# Patient Record
Sex: MALE | Race: AMERICAN INDIAN OR ALASKA NATIVE
[De-identification: names, ages, dates, MRNs, and addresses within clinical notes are randomized per-mention and may not be internally consistent; named-entity substitution may affect disease eponyms.]

---

## 2019-01-04 ENCOUNTER — HOSPITAL ENCOUNTER (OUTPATIENT)
Dept: HOSPITAL 42 - RAD | Age: 74
End: 2019-01-04
Payer: MEDICARE

## 2019-05-25 PROBLEM — Z00.00 ENCOUNTER FOR PREVENTIVE HEALTH EXAMINATION: Status: ACTIVE | Noted: 2019-05-25

## 2019-06-24 ENCOUNTER — APPOINTMENT (OUTPATIENT)
Dept: ORTHOPEDIC SURGERY | Facility: CLINIC | Age: 74
End: 2019-06-24
Payer: MEDICARE

## 2019-06-24 DIAGNOSIS — Z82.61 FAMILY HISTORY OF ARTHRITIS: ICD-10-CM

## 2019-06-24 DIAGNOSIS — Z87.39 PERSONAL HISTORY OF OTHER DISEASES OF THE MUSCULOSKELETAL SYSTEM AND CONNECTIVE TISSUE: ICD-10-CM

## 2019-06-24 DIAGNOSIS — M25.562 PAIN IN LEFT KNEE: ICD-10-CM

## 2019-06-24 DIAGNOSIS — Z86.39 PERSONAL HISTORY OF OTHER ENDOCRINE, NUTRITIONAL AND METABOLIC DISEASE: ICD-10-CM

## 2019-06-24 DIAGNOSIS — Z86.73 PERSONAL HISTORY OF TRANSIENT ISCHEMIC ATTACK (TIA), AND CEREBRAL INFARCTION W/OUT RESIDUAL DEFICITS: ICD-10-CM

## 2019-06-24 DIAGNOSIS — M25.561 PAIN IN RIGHT KNEE: ICD-10-CM

## 2019-06-24 DIAGNOSIS — Z86.79 PERSONAL HISTORY OF OTHER DISEASES OF THE CIRCULATORY SYSTEM: ICD-10-CM

## 2019-06-24 PROCEDURE — 73562 X-RAY EXAM OF KNEE 3: CPT | Mod: 50

## 2019-06-24 PROCEDURE — 99203 OFFICE O/P NEW LOW 30 MIN: CPT

## 2019-06-24 RX ORDER — IBUPROFEN 200 MG/1
200 TABLET, FILM COATED ORAL
Refills: 0 | Status: ACTIVE | COMMUNITY

## 2019-06-24 RX ORDER — MV-MIN/FOLIC/K1/LYCOPEN/LUTEIN 300-60 MCG
TABLET ORAL
Refills: 0 | Status: ACTIVE | COMMUNITY

## 2019-06-24 RX ORDER — EZETIMIBE 10 MG/1
10 TABLET ORAL
Refills: 0 | Status: ACTIVE | COMMUNITY

## 2019-06-24 RX ORDER — GABAPENTIN 300 MG
300 TABLET ORAL
Refills: 0 | Status: ACTIVE | COMMUNITY

## 2019-06-24 RX ORDER — TELMISARTAN AND HYDROCHLOROTHIAZIDE 80; 12.5 MG/1; MG/1
80-12.5 TABLET ORAL
Refills: 0 | Status: ACTIVE | COMMUNITY

## 2019-06-24 RX ORDER — MULTIVIT-MIN/FOLIC/VIT K/LYCOP 400-300MCG
1000 TABLET ORAL
Refills: 0 | Status: ACTIVE | COMMUNITY

## 2019-06-24 RX ORDER — MAGNESIUM CHLORIDE 64 MG
TABLET, DELAYED RELEASE (ENTERIC COATED) ORAL
Refills: 0 | Status: ACTIVE | COMMUNITY

## 2019-06-24 RX ORDER — OXYCODONE HYDROCHLORIDE AND ACETAMINOPHEN 7.5; 325 MG/1; MG/1
7.5-325 TABLET ORAL
Refills: 0 | Status: ACTIVE | COMMUNITY

## 2019-06-24 RX ORDER — BLOOD SUGAR DIAGNOSTIC
300 STRIP MISCELLANEOUS
Refills: 0 | Status: ACTIVE | COMMUNITY

## 2019-06-24 RX ORDER — CARVEDILOL PHOSPHATE 40 MG/1
40 CAPSULE, EXTENDED RELEASE ORAL
Refills: 0 | Status: ACTIVE | COMMUNITY

## 2019-06-24 RX ORDER — CLOPIDOGREL 75 MG/1
75 TABLET, FILM COATED ORAL
Refills: 0 | Status: ACTIVE | COMMUNITY

## 2019-06-24 RX ORDER — DIGOXIN 250 UG/1
250 TABLET ORAL
Refills: 0 | Status: ACTIVE | COMMUNITY

## 2019-06-24 RX ORDER — ADHESIVE TAPE 3"X 2.3 YD
50 MCG TAPE, NON-MEDICATED TOPICAL
Refills: 0 | Status: ACTIVE | COMMUNITY

## 2019-06-24 RX ORDER — ZOLPIDEM TARTRATE 10 MG/1
10 TABLET ORAL
Refills: 0 | Status: ACTIVE | COMMUNITY

## 2019-06-24 RX ORDER — DEXLANSOPRAZOLE 30 MG/1
30 CAPSULE, DELAYED RELEASE ORAL
Refills: 0 | Status: ACTIVE | COMMUNITY

## 2019-06-24 RX ORDER — ATORVASTATIN CALCIUM 40 MG/1
40 TABLET, FILM COATED ORAL
Refills: 0 | Status: ACTIVE | COMMUNITY

## 2019-06-24 NOTE — PHYSICAL EXAM
[de-identified] : Radiographs from 6/24/19 AP lateral and skyline views of both knees show the bony structures to be intact, he has almost complete loss of the medial joint space of both knees.  [de-identified] : General appearance: well nourished and hydrated, pleasant, alert and oriented x 3, cooperative.\par Cardiovascular: no apparent abnormalities, no lower leg edema, no varicosities, pedal pulses are palpable.\par Neurologic: sensation is normal, no muscle weakness in upper or lower extremities\par Dermatologic no apparent skin lesions, moist, warm, no rash.\par Gait: nonantalgic.\par \par Left knee\par Inspection: no effusion or erythema.\par Wounds: none.\par Alignment: normal.\par Palpation: medial joint line tenderness\par ROM: Full ROM \par Ligamentous laxity: all ligaments appear stable\par Meniscal Test: n/a\par Muscle Test: good quad strength.\par \par Right knee\par Inspection: no effusion or erythema.\par Wounds: none.\par Alignment: normal.\par Palpation: medial joint line tenderness\par ROM: Full ROM\par Ligamentous laxity: all ligaments appear stable\par Meniscal Test: n/a\par Muscle Test: good quad strength.\par \par Left hip\par Inspection: No swelling or ecchymosis.\par Wounds: none.\par Palpation: non-tender.\par Stability: no instability.\par Strength: 5/5 all motor groups.\par ROM: no pain with FROM.\par Leg length: equal.\par \par Right hip\par Inspection: No swelling or ecchymosis.\par Wounds: none.\par Palpation: non-tender.\par Stability: no instability.\par Strength: 5/5 all motor groups.\par ROM: no pain with FROM.\par Leg length: equal.

## 2019-06-24 NOTE — ASSESSMENT
[FreeTextEntry1] : Pt complaining of bilateral knee pain. He had a cortisone injection by his PCP in the right knee in February. He also had injections in Sept and Dec with relief. Currently he describes pain that is a 9/10 at times, difficulty walking more then a block. Treatment options were discussed both surgical and non surgical, he opted for gel injections. Pt is also suffering from back pain, his PCP has prescribed Percocet for that, i suggested he see a spine specialist to see if there is other alternatives for the treatment of his back pain.

## 2019-06-24 NOTE — HISTORY OF PRESENT ILLNESS
[Pain Location] : pain [Worsening] : worsening [10] : a maximum pain level of 10/10 [Walking] : walking [Standing] : standing [Daily] : ~He/She~ states the symptoms seem to be occuring daily [de-identified] : 74 year old male presents to the office today complaining of bilateral knee pain. He states that his knee pain first began in the right knee, but in recent months he has been noticing the pain increase in his left knee. Pt reports a pain that is sharp in nature. He reports buckling and clicking in bilateral knees. He also reports swelling in the right knee that is more prominent than the swelling in the left knee. Pt reports using a cane occasionally which he reports using due to his knees buckling. Pt reports only being able to ambulate about 1/2 a block before pain stops him. Pt reports increased pain with negotiating stairs, long periods of ambulation, and standing for long periods of time. Pt reports having to ambulate down the stairs backward due to his knee pain. Pt reports taking Motrin with minimal relief and Percocet for both his back and knee with some relief. Pt reports having cortisone injections in the past, September 2018 and December 2018 with good relief. Pt is here today to discuss both his surgical and non surgical options for pain relief.

## 2019-07-17 ENCOUNTER — APPOINTMENT (OUTPATIENT)
Dept: ORTHOPEDIC SURGERY | Facility: CLINIC | Age: 74
End: 2019-07-17
Payer: MEDICARE

## 2019-07-17 RX ORDER — HYALURONATE SODIUM 20 MG/2 ML
20 SYRINGE (ML) INTRAARTICULAR
Refills: 0 | Status: COMPLETED | OUTPATIENT
Start: 2019-07-17

## 2019-07-17 RX ADMIN — Medication 2 MG/2ML: at 00:00

## 2019-07-17 NOTE — HISTORY OF PRESENT ILLNESS
[de-identified] : 74 year old male presents to the office today for his first set of Euflexxa injections in his bilateral arthritic knees.

## 2019-07-17 NOTE — ASSESSMENT
[FreeTextEntry1] : Discussed at length with the patient the planned Euflexxa injection. The risks, benefits, convalescence and alternatives were reviewed. The possible side effects discussed included but were not limited to: pain, swelling, heat and redness. There symptoms are generally mild but if they are extensive then contact the office. \par \par Following the discussion, the knee was prepped with Betadine and under sterile technique the Euflexxa injection was performed. The needle was introduced into the joint, aspiration was performed to ensure intra-articular placement and the medication was injected. Upon withdrawal of the needle the site was cleaned with alcohol and a Band-Aid applied. The patient tolerated the injection well and there were no adverse effects. Post injection instructions included not strenuous activity for 24 hours, cryotherapy and if there were any adverse effects to contact the office.

## 2019-07-24 ENCOUNTER — APPOINTMENT (OUTPATIENT)
Dept: ORTHOPEDIC SURGERY | Facility: CLINIC | Age: 74
End: 2019-07-24
Payer: MEDICARE

## 2019-07-24 RX ORDER — HYALURONATE SODIUM 20 MG/2 ML
20 SYRINGE (ML) INTRAARTICULAR
Refills: 0 | Status: COMPLETED | OUTPATIENT
Start: 2019-07-24

## 2019-07-24 RX ADMIN — Medication 2 MG/2ML: at 00:00

## 2019-07-24 NOTE — ASSESSMENT
[FreeTextEntry1] : Discussed at length with the patient the planned Euflexxa injection. The risks, benefits, convalescence and alternatives were reviewed. The possible side effects discussed included but were not limited to: pain, swelling, heat and redness. There symptoms are generally mild but if they are extensive then contact the office. \par \par Following the discussion, the knee was prepped with Betadine and under sterile technique the Euflexxa injection was performed. The needle was introduced into the joint, aspiration was performed to ensure intra-articular placement and the medication was injected. Upon withdrawal of the needle the site was cleaned with alcohol and a Band-Aid applied. The patient tolerated the injection well and there were no adverse effects. Post injection instructions included not strenuous activity for 24 hours, cryotherapy and if there were any adverse effects to contact the office.\par

## 2019-07-24 NOTE — HISTORY OF PRESENT ILLNESS
[de-identified] : 74 year old male presents to the office today for his second Euflexxa injection in his bilateral arthritic knees.

## 2019-07-31 ENCOUNTER — APPOINTMENT (OUTPATIENT)
Dept: ORTHOPEDIC SURGERY | Facility: CLINIC | Age: 74
End: 2019-07-31
Payer: MEDICARE

## 2019-07-31 RX ORDER — HYALURONATE SODIUM 20 MG/2 ML
20 SYRINGE (ML) INTRAARTICULAR
Refills: 0 | Status: COMPLETED | OUTPATIENT
Start: 2019-07-31

## 2019-07-31 RX ADMIN — Medication 2 MG/2ML: at 00:00

## 2019-07-31 NOTE — HISTORY OF PRESENT ILLNESS
[de-identified] : 74 year old male presents to the office today for his third Euflexxa injection in his bilateral arthritic knees

## 2021-03-31 ENCOUNTER — APPOINTMENT (OUTPATIENT)
Dept: ORTHOPEDIC SURGERY | Facility: CLINIC | Age: 76
End: 2021-03-31
Payer: MEDICARE

## 2021-03-31 PROCEDURE — 73562 X-RAY EXAM OF KNEE 3: CPT | Mod: LT

## 2021-03-31 PROCEDURE — 99214 OFFICE O/P EST MOD 30 MIN: CPT

## 2021-03-31 NOTE — ASSESSMENT
[FreeTextEntry1] : 75 year old male presents to the office to follow up on his bilateral arthritic knees after having a series of Euflexxa injections, the last being 07-. These gave him relief, but unfortunately the pain has returned. He would like to try another course of Euflexxa injections. We discussed elective total knee replacement but he is not interested in that at this time. He elected to try gel injections since they were helpful the last time he received them. He will think about knee replacement for the future \par \par **ORDER GEL INJECTIONS BILATERAL KNEES**

## 2021-03-31 NOTE — PHYSICAL EXAM
[de-identified] : Left Knee\par Inspection: no effusion or erythema.\par Wounds: none.\par Alignment: 10 degrees of varus\par Palpation: medial joint line tenderness \par ROM: 0-115 degrees\par Ligamentous laxity: all ligaments appear stable\par Muscle Test: good quad strength.\par Leg examination: calf is soft and non-tender.\par \par Right knee\par Inspection: no effusion or erythema.\par Wounds: none.\par Alignment: 10 degrees varus\par Palpation: medial joint line tenderness \par ROM: 0-115 degrees\par Ligamentous laxity: all ligaments appear \par Muscle Test: good quad strength.\par Leg examination: calf is soft and non-tender.\par  \par right and left hip-\par FROM no pain  [de-identified] : Radiographs done today AP lateral and skyline of both knees shows almost complete loss of the medial joint spaces bilaterally.

## 2021-03-31 NOTE — HISTORY OF PRESENT ILLNESS
[Pain Location] : pain [Worsening] : worsening [9] : a maximum pain level of 9/10 [Walking] : walking [Standing] : standing [Constant] : ~He/She~ states the symptoms seem to be constant [de-identified] : 75 year old male presents to the office today for a follow up on his bilateral knee pain secondary to his bilateral knee osteoarthritis. Patient was last seen in our office for Euflexxa injections in July 2019 which he reports gave him great relief. Today, patient states his pain has returned. The pain is achy in nature. He reports swelling in the right knee and buckling, locking, clicking, in both knees. There is pain with only a few feet of ambulation. There is also pain with walking down the stairs causing him to go backward and pain with ascending. Patient reports increased pain with standing from a seated position. Patient reports taking Tylenol with only some pain relief in his knees. He is also taking Percocet 7.5mg/325mg for his back. Patient is here today to discuss his next options for pain relief.

## 2021-04-30 ENCOUNTER — APPOINTMENT (OUTPATIENT)
Dept: ORTHOPEDIC SURGERY | Facility: CLINIC | Age: 76
End: 2021-04-30
Payer: MEDICARE

## 2021-04-30 PROCEDURE — 99212 OFFICE O/P EST SF 10 MIN: CPT | Mod: 25

## 2021-04-30 PROCEDURE — 20610 DRAIN/INJ JOINT/BURSA W/O US: CPT | Mod: RT

## 2021-04-30 RX ORDER — HYALURONATE SODIUM 20 MG/2 ML
20 SYRINGE (ML) INTRAARTICULAR
Refills: 0 | Status: COMPLETED | OUTPATIENT
Start: 2021-04-30

## 2021-04-30 RX ADMIN — Medication 2 MG/2ML: at 00:00

## 2021-04-30 NOTE — HISTORY OF PRESENT ILLNESS
[de-identified] : 75 year old male presents to the office today for a Euflexxa injection into his bilateral arthritic knees.

## 2021-05-07 ENCOUNTER — APPOINTMENT (OUTPATIENT)
Dept: ORTHOPEDIC SURGERY | Facility: CLINIC | Age: 76
End: 2021-05-07
Payer: MEDICARE

## 2021-05-07 VITALS — TEMPERATURE: 97.7 F

## 2021-05-07 PROCEDURE — 20610 DRAIN/INJ JOINT/BURSA W/O US: CPT | Mod: LT

## 2021-05-07 PROCEDURE — 99213 OFFICE O/P EST LOW 20 MIN: CPT | Mod: 25

## 2021-05-07 RX ORDER — HYALURONATE SODIUM 20 MG/2 ML
20 SYRINGE (ML) INTRAARTICULAR
Refills: 0 | Status: COMPLETED | OUTPATIENT
Start: 2021-05-07

## 2021-05-07 RX ADMIN — Medication 2 MG/2ML: at 00:00

## 2021-05-07 NOTE — HISTORY OF PRESENT ILLNESS
[de-identified] : 75 year old male presents to the office today for a Euflexxa injection into his bilateral arthritic knees.

## 2021-05-14 ENCOUNTER — APPOINTMENT (OUTPATIENT)
Dept: ORTHOPEDIC SURGERY | Facility: CLINIC | Age: 76
End: 2021-05-14
Payer: MEDICARE

## 2021-05-14 VITALS — TEMPERATURE: 97.8 F

## 2021-05-14 DIAGNOSIS — M17.11 UNILATERAL PRIMARY OSTEOARTHRITIS, RIGHT KNEE: ICD-10-CM

## 2021-05-14 DIAGNOSIS — M17.12 UNILATERAL PRIMARY OSTEOARTHRITIS, LEFT KNEE: ICD-10-CM

## 2021-05-14 PROCEDURE — 20610 DRAIN/INJ JOINT/BURSA W/O US: CPT | Mod: LT

## 2021-05-14 PROCEDURE — 99212 OFFICE O/P EST SF 10 MIN: CPT | Mod: 25

## 2021-05-14 RX ORDER — HYALURONATE SODIUM 20 MG/2 ML
20 SYRINGE (ML) INTRAARTICULAR
Refills: 0 | Status: COMPLETED | OUTPATIENT
Start: 2021-05-14

## 2021-05-14 RX ADMIN — Medication 2 MG/2ML: at 00:00

## 2021-05-14 NOTE — HISTORY OF PRESENT ILLNESS
[de-identified] : 75 year old male presents to the office today for a Euflexxa injection into his bilateral arthritic knees.